# Patient Record
Sex: FEMALE | Race: WHITE | HISPANIC OR LATINO | URBAN - METROPOLITAN AREA
[De-identification: names, ages, dates, MRNs, and addresses within clinical notes are randomized per-mention and may not be internally consistent; named-entity substitution may affect disease eponyms.]

---

## 2024-11-20 ENCOUNTER — LAB (OUTPATIENT)
Dept: LAB | Facility: LAB | Age: 24
End: 2024-11-20
Payer: COMMERCIAL

## 2024-11-20 DIAGNOSIS — Z79.899 OTHER LONG TERM (CURRENT) DRUG THERAPY: Primary | ICD-10-CM

## 2024-11-20 LAB
ALT SERPL W P-5'-P-CCNC: 10 U/L (ref 7–45)
AST SERPL W P-5'-P-CCNC: 13 U/L (ref 9–39)
CHOLEST SERPL-MCNC: 215 MG/DL (ref 0–199)
TRIGL SERPL-MCNC: 145 MG/DL (ref 0–149)

## 2024-11-20 PROCEDURE — 84450 TRANSFERASE (AST) (SGOT): CPT

## 2024-11-20 PROCEDURE — 36415 COLL VENOUS BLD VENIPUNCTURE: CPT

## 2024-11-20 PROCEDURE — 84478 ASSAY OF TRIGLYCERIDES: CPT

## 2024-11-20 PROCEDURE — 84460 ALANINE AMINO (ALT) (SGPT): CPT

## 2024-11-20 PROCEDURE — 82465 ASSAY BLD/SERUM CHOLESTEROL: CPT

## 2025-06-14 ENCOUNTER — OFFICE VISIT (OUTPATIENT)
Dept: URGENT CARE | Age: 25
End: 2025-06-14
Payer: COMMERCIAL

## 2025-06-14 VITALS
RESPIRATION RATE: 20 BRPM | TEMPERATURE: 97.8 F | DIASTOLIC BLOOD PRESSURE: 74 MMHG | SYSTOLIC BLOOD PRESSURE: 103 MMHG | HEART RATE: 67 BPM | OXYGEN SATURATION: 97 %

## 2025-06-14 DIAGNOSIS — H60.542 ACUTE ECZEMATOID OTITIS EXTERNA OF LEFT EAR: ICD-10-CM

## 2025-06-14 DIAGNOSIS — H60.11: Primary | ICD-10-CM

## 2025-06-14 PROBLEM — F39 MOOD DISORDER: Status: ACTIVE | Noted: 2021-03-23

## 2025-06-14 PROBLEM — F33.0 MILD EPISODE OF RECURRENT MAJOR DEPRESSIVE DISORDER: Chronic | Status: ACTIVE | Noted: 2020-03-06

## 2025-06-14 PROBLEM — F41.9 ANXIETY: Chronic | Status: ACTIVE | Noted: 2020-03-06

## 2025-06-14 RX ORDER — NEOMYCIN SULFATE, POLYMYXIN B SULFATE, HYDROCORTISONE 3.5; 10000; 1 MG/ML; [USP'U]/ML; MG/ML
3 SOLUTION/ DROPS AURICULAR (OTIC) 4 TIMES DAILY
Qty: 10 ML | Refills: 0 | Status: SHIPPED | OUTPATIENT
Start: 2025-06-14 | End: 2025-06-24

## 2025-06-14 RX ORDER — NEOMYCIN SULFATE, POLYMYXIN B SULFATE, HYDROCORTISONE 3.5; 10000; 1 MG/ML; [USP'U]/ML; MG/ML
3 SOLUTION/ DROPS AURICULAR (OTIC) 4 TIMES DAILY
Qty: 10 ML | Refills: 0 | Status: SHIPPED | OUTPATIENT
Start: 2025-06-14 | End: 2025-06-14

## 2025-06-14 RX ORDER — TRAZODONE HYDROCHLORIDE 50 MG/1
50 TABLET ORAL NIGHTLY PRN
COMMUNITY

## 2025-06-14 RX ORDER — SPIRONOLACTONE 100 MG/1
150 TABLET, FILM COATED ORAL
COMMUNITY
Start: 2024-04-10

## 2025-06-14 RX ORDER — SERTRALINE HYDROCHLORIDE 100 MG/1
100 TABLET, FILM COATED ORAL
COMMUNITY
Start: 2025-05-16

## 2025-06-14 RX ORDER — CEPHALEXIN 500 MG/1
500 CAPSULE ORAL 4 TIMES DAILY
Qty: 30 CAPSULE | Refills: 0 | Status: SHIPPED | OUTPATIENT
Start: 2025-06-14 | End: 2025-06-21

## 2025-06-14 NOTE — PATIENT INSTRUCTIONS
Treatment Instructions:  Cortisporin Otic Drops: Use as prescribed, typically 4 drops into the affected ear 3-4 times daily. Keep the head tilted for a few minutes after applying drops to allow absorption.  Keflex (Cephalexin): Take exactly as prescribed, usually 4 times daily for 7-10 days. Complete the full course even if symptoms improve early.  General Care:  Keep the ear dry. Avoid swimming or getting water in the ear while showering (use a cotton ball with petroleum jelly in the outer ear if needed).  Apply a warm compress to the ear for pain relief.  You may take ibuprofen or acetaminophen for discomfort, unless contraindicated.  Avoid using Q-tips or inserting anything into the ear.  Follow-Up:  Follow up in 48-72 hours if symptoms are not improving or sooner if worsening redness, swelling, drainage, fever, or spreading of infection occurs.  Seek urgent care if you develop high fever, facial swelling, severe pain, or dizziness.

## 2025-06-14 NOTE — PROGRESS NOTES
Subjective   Patient ID: Amina Singh is a 25 y.o. female. They present today with a chief complaint of Earache.    History of Present Illness  25-year-old female presents with left ear pain and swelling that began 2 days ago. She reports a history of dry skin on her ears and admits to frequently picking at them. Since yesterday, she has noticed increasing redness and tenderness of the outer ear along with some drainage from the ear canal. No hearing changes reported. Denies fever, dizziness, ear fullness, jaw pain, or recent upper respiratory infection.          Past Medical History  Allergies as of 06/14/2025    (No Known Allergies)       Prescriptions Prior to Admission[1]     Medical History[2]    Surgical History[3]     reports that she has never smoked. She has never used smokeless tobacco.    Review of Systems  Review of Systems    Constitutional: Denies fever, chills, or fatigue.    HEENT: Positive for left ear pain, redness, tenderness, and drainage. Denies hearing loss, ear fullness, dizziness, jaw pain, nasal congestion, sore throat, or recent upper respiratory infection.    Dermatologic: Reports dry skin on ears. Denies rash elsewhere.    Neurologic: Denies headache, weakness, or numbness.    Objective    Vitals:    06/14/25 1837   BP: 103/74   Pulse: 67   Resp: 20   Temp: 36.6 °C (97.8 °F)   TempSrc: Oral   SpO2: 97%     No LMP recorded.    Physical Exam  General: Alert, well-nourished female, no acute distress.    HEENT:    Left Ear: Pinna erythematous, swollen, and tender. Moderate erythema  and swelling of the external canal. Canal mildly edematous.    Left TM: Intact and normal in appearance, no erythema or bulging.    Right Ear: External ear and canal normal. TM intact and normal.    Neck: Supple, no cervical lymphadenopathy.    Skin: Dry skin noted over ears. No other dermatologic abnormalities observed.  Procedures    Point of Care Test & Imaging Results from this visit  No results found  for this visit on 06/14/25.   Imaging  No results found.    Cardiology, Vascular, and Other Imaging  No other imaging results found for the past 2 days      Diagnostic study results (if any) were reviewed by EVELIO Fallon.    Assessment/Plan   Allergies, medications, history, and pertinent labs/EKGs/Imaging reviewed by EVELIO Fallon.     Medical Decision Making  25-year-old female presents with signs and symptoms consistent with otitis externa complicated by localized cellulitis of the pinna. Examination revealed tenderness of the outer ear canal, erythema, and mild edema extending to the pinna. No signs of systemic infection, mastoid tenderness, or middle ear involvement. No fluctuance noted.  Due to external canal involvement and surrounding soft tissue erythema, dual therapy was initiated with topical Cortisporin otic drops for local infection and Keflex for cellulitis coverage. The patient is immunocompetent and stable for outpatient treatment. Red flag symptoms were reviewed, including progression of swelling, fever, or spread of erythema beyond current margins. Patient advised to keep ear dry and follow up or return for re-evaluation in 48-72 hours or sooner if symptoms worsen. Patient verbalized understanding and agreeable with plan of care.     Orders and Diagnoses  Diagnoses and all orders for this visit:  Cellulitis of pinna, right  -     cephalexin (Keflex) 500 mg capsule; Take 1 capsule (500 mg) by mouth 4 times a day for 7 days. You will have 2 extra tablets in container that you may dispose of  Acute eczematoid otitis externa of left ear  -     neomycin-polymyxin-HC (Cortisporin) otic solution; Administer 3 drops into the right ear 4 times a day for 10 days.      Medical Admin Record      Patient disposition: Home    Electronically signed by EVELIO Fallon  8:11 PM           [1] (Not in a hospital admission)   [2] History reviewed. No pertinent past medical  history.  [3] History reviewed. No pertinent surgical history.